# Patient Record
(demographics unavailable — no encounter records)

---

## 2024-12-02 NOTE — HISTORY OF PRESENT ILLNESS
[] : yes [de-identified] : 12/2/24:  71 yo F here for a second opinion regarding her lower back   CT scan may 2023-  SEE REPORT [facility]   HLD/hypothryroidism left BOBBI done 5/24 left TKA done about 14 years  right hip done in 2021 kidney stones cushing's disease - on steroids diffuse OA pancreas issues depression/axniety HLD hypothryoid TIA on plavix skin cancer history  retired   xrays today: L spine - L2-S1 PSF  AP PELVIS - b bobbi   Has had injection in the knee with Dr De La Garza [FreeTextEntry5] : IMMACULATE SAGRARIO angeles 72 year F  here for evaluation of her lower back. Patient states she has history of worsening lower back pain, had surgery with Dr. Ndiaye 9/1/21.

## 2024-12-02 NOTE — DISCUSSION/SUMMARY
[Medication Risks Reviewed] : Medication risks reviewed [de-identified] : reviewed the case and the imaging with the patient  lower back pain  LIMITED ambulation capacity  prior L2-S1 fusion - hardware appears okay on the xrays certainly has a very tender GT bursitis  discussion of the condition and treatment options cautions discussed questions answered discussion of natural history of the condition and what the next step would be updated mri and CT scan of the L spine  fu to review the imaging  may or may not be a good candidate for further surgery  may want to see pain management

## 2024-12-16 NOTE — HISTORY OF PRESENT ILLNESS
[] : yes [de-identified] : 12/2/24:  71 yo F here for a second opinion regarding her lower back   CT scan may 2023-  SEE REPORT [facility]   HLD/hypothryroidism left BOBBI done 5/24 left TKA done about 14 years  right hip done in 2021 kidney stones cushing's disease - on steroids diffuse OA pancreas issues depression/axniety HLD hypothryoid TIA on plavix skin cancer history  retired   xrays today: L spine - L2-S1 PSF  AP PELVIS - b bobbi   Has had injection in the knee with Dr De La Garza  12/16/2024: patient is here today for CT and MRI results of L spine, states she is still having pain within the area. also stated she is using lidocaine patches to help relief pain and completed a bone density test but no report completed.  MRi L spine - adjacent segment degen changes at robyn L1-2 segment   CT scan L spine - no fracture, hardware in position  [FreeTextEntry5] : IMMACULATE SAGRARIO angeles 72 year F  here for evaluation of her lower back. Patient states she has history of worsening lower back pain, had surgery with Dr. Ndiaye 9/1/21.

## 2024-12-16 NOTE — DISCUSSION/SUMMARY
[Medication Risks Reviewed] : Medication risks reviewed [de-identified] : reviewed the case and the imaging with the patient  lower back pain  LIMITED ambulation capacity  prior L2-S1 fusion - hardware appears okay on the xrays - has ASD above the prior fusion  certainly has a very tender GT bursitis  main issue appears to back pain  discussion of the condition and treatment options cautions discussed questions answered discussion of extension of the fusion to t10 for ASD  she needs to try PT